# Patient Record
(demographics unavailable — no encounter records)

---

## 2017-01-04 NOTE — REP
FACET BLOCK:

 

The images were reviewed with Dr. Kruse.

 

The patient has a history of low back pain.

 

The portable C-Arm is provided in the OR for Dr. Gracia for fluoroscopic

guidance. Four intraoperative fluoro spot films were obtained for needle

placement verification for bilateral lumbar facet injection. The films are on the

PACs system and are available for review.

 

36 seconds of fluoroscopy time was utilized for this procedure.

 

 

Reviewed by

JERROD Johnson 01/05/2017 09:08 AEdited and Signed by

Shon Kruse MD 01/05/2017 03:18 P

## 2017-01-14 NOTE — ECWPNPC
PATIENT NAME: JERRY MILLER

: 1955

GENDER: FEMALE

MRN: [id number]

VISIT DATE: 2017

DISCHARGE DATE: 17

VISIT LOCKED DATE TIME: 

PHYSICIAN: PEDRO HERNÁNDEZ  

RESOURCE: PEDRO HERNÁNDEZ  

 

           

           

REASON FOR APPOINTMENT

           

          1. LFBD

           

HISTORY OF PRESENT ILLNESS

           

      HISTORY OF PRESENT ILLNESS:

      PAIN

           

           

          THE PATIENT DESCRIBES THE PAIN...

           

      FALL RISK SCREENING:

      SCREENING

           

           

          :NO FALLS IN THE PAST YEAR

           

CURRENT MEDICATIONS

           

          TAKING CYMBALTA 60 MG CAPSULE DELAYED RELEASE PARTICLES 1 CAPSULE

          ORALLY TWICE A DAY, NOTES: 17 0800

          TAKING SKELAXIN 800 MG TABLET 1 TABLET ORALLY BID PRN, NOTES:

          17 08

          TAKING GABAPENTIN 300 MG CAPSULE 1 CAPSULE ORALLY THREE TIMES A

          DAY FOR PAINMDD3, NOTES: 17

          TAKING ALLOPURINOL 300 MG TABLET 1 TABLET ORALLY ONCE A DAY,

          NOTES: 17 08

          TAKING SINGULAIR 10 MG TABLET 1 TABLET IN THE EVENING ORALLY ONCE

          A DAY, NOTES: 17 08

          TAKING BENTYL 20 MG TABLET 1 TABLET ORALLY EVERY 4 HOURS AS

          NEEDED, NOTES: 17 0800

          TAKING VENTOLIN  (90 BASE) MCG/ACT AEROSOL SOLUTION 2

          PUFFS INHALATION AS NEEDED, NOTES: NONE RECENTLY

          TAKING VITAMIN B COMPLEX CAPSULE AS DIRECTED ORALLY DAILY, NOTES:

          17 0800

          TAKING HYDROCHLOROTHIAZIDE 25 25 MG TABLET AS DIRECTED ORAL

          DAILY, NOTES: 17 0800

          TAKING LATANOPROST 0.005 % SOLUTION 1 DROP INTO BOTH EYES EVENING

          OPHTHALMIC ONCE A DAY, NOTES: 1/3/17 2100

          TAKING SYMBICORT 80-4.5 MCG/ACT AEROSOL 2 PUFFS INHALATION TWICE

          A DAY, NOTES: 17 0800

          TAKING PREMARIN 0.625 MG/GM CREAM VAGINAL THREE TIMES A WEEK,

          NOTES: 17

          TAKING TYLENOL 500 MG TABLET 1 TABLET AS NEEDED ORALLY EVERY 6

          HRS, NOTES: NONE RECENTLY

          TAKING BENTYL 10 MG CAPSULE 1 CAPSULE ORALLY TWO TIMES A DAY,

          NOTES: NONE RECENTLY

          TAKING REFRESH CELLUVISC 1 % SOLUTION 1 DROP INTO AFFECTED EYE AS

          NEEDED OPHTHALMIC 24 TIME(S) A DAY, NOTES: 17 1400

          TAKING DIFLUCAN 200 MG TABLET 1 TABLET ORALLY AS DIRECTED, NOTES:

          MONTHS

          TAKING FIBER 58.6 % POWDER ORALLY AS NEEDED, NOTES: 17 0800

          TAKING CRANBERRY 405 MG CAPSULE ORALLY DAILY, NOTES: 17 0800

          TAKING ZYRTEC 10 MG TABLET CHEWABLE 1 TABLET ORALLY ONCE A DAY,

          NOTES: 17 79938

          TAKING TRAMADOL HCL 50 MG TABLET 1 TABLET AS NEEDED ORALLY Q8H

          PRN MDD3, NOTES: 17

          TAKING FLONASE ALLERGY RELIEF 50 MCG/ACT SUSPENSION 1 SPRAY IN

          EACH NOSTRIL NASALLY ONCE A DAY, NOTES: 17 0800

          TAKING OXYBUTYNIN CHLORIDE 5 MG TABLET 2 TABLETS ORALLY 2 TIMES A

          DAY, NOTES: 17 0800

          TAKING CABERGOLINE 0.5 MG TABLET 0.5 TABLET ORALLY TWICE A WEEK

          (TAKES 0.05 MG 1/2 TABLET), NOTES: MEDICATION DISCONTINUED

          NOT-TAKING XIIDRA 5 % SOLUTION 1 DROP INTO AFFECTED EYE

          OPHTHALMIC TWICE A DAY

          NOT-TAKING MONOVISC 88 MG/4ML SOLUTION PREFILLED SYRINGE

          INTRA-ARTICULAR EVERY 6 MONTHS, NOTES: EVERY 6 MO (LAST 16)

          NOT-TAKING RHINOCORT AQUA 32 MCG/ACT SUSPENSION 1 SPRAY IN EACH

          NOSTRIL NASALLY AS NEEDED

          NOT-TAKING OXYBUTYNIN CHLORIDE ER 10 MG TABLET EXTENDED RELEASE

          24 HOUR 1 TABLET ORALLY BID

          NOT-TAKING ULTRAM 50 MG TABLET 1 TABLET AS NEEDED FOR PAIN ORALLY

          EVERY 6 HRS MDD1

          NOT-TAKING NASACORT ALLERGY 24HR 55 MCG/ACT AEROSOL 1 PUFF IN

          EACH NOSTRIL NASALLY ONCE A DAY

          NOT-TAKING CELEBREX 100 MG CAPSULE 1 CAPSULE ORALLY TWICE A DAY

          NOT-TAKING VITAMIN C 500 MG TABLET AS DIRECTED ORALLY DAILY,

          NOTES: 16 0900

          MEDICATION LIST REVIEWED AND RECONCILED WITH THE PATIENT

           

PAST MEDICAL HISTORY

           

          DIABETES

          BLOOD PRESSURE

          ASTHMA

          PITUITARY ADENOMA

          ARTHRITIS

          RUPTURED DISC/BACK PAIN

          IBS WITH DIARRHEA

          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

           

ALLERGIES

           

          PENICILLIN (FOR ALLERGIES USE ONLY): RASH: ALLERGY

          SULFA (FOR ALLERGY USE ONLY): RASH: ALLERGY

          PHENERGAN: BLOOD CLOT: SIDE EFFECTS

          LATEX CONDOMS, RUBBER BANDS, GLOVES: RASH: ALLERGY

          ERYTHROMYCIN: RASH: ALLERGY

          NICLKEL: RASH: ALLERGY

          SULFITES: DYSPNEA: ALLERGY

           

SURGICAL HISTORY

           

          BTL 

          GB 

          ACL 

          ROTATOR CUFF 2015

           

HOSPITALIZATION/MAJOR DIAGNOSTIC PROCEDURE

           

          SURGERY RELATED

          ASTHMA

           

REVIEW OF SYSTEMS

           

      CONSTITUTIONAL:

           

          ANY CHANGE IN YOUR MEDICAL CONDITION? NO . CHILLS NO . FEVER NO .

           

      INFECTION:

           

          DO YOU HAVE NEW INFECTIONS? NO . DO YOU HAVE HISTORY OF MRSA? NO

          .

           

      MUSCULOSKELETAL:

           

          ANY NEW PATTERNS OF PAIN OR NUMBNESS? NO .

           

      GASTROENTEROLOGY:

           

          ANY NEW CHANGE IN BOWEL CONTROL? NO .

           

      GENITOURINARY:

           

          ANY NEW CHANGE IN BLADDER CONTROL? NO . IS THERE A CHANCE YOU

          COULD BE PREGNANT? NO .

           

      HEMATOLOGY/LYMPH:

           

          DO YOU TAKE ANY BLOOD THINNERS? (FOR EXAMPLE- COUMADIN, PLAVIX,

          AGGRENOX, PLATEL, PRADAXA, OR XARELTO) NO . WHEN WAS YOUR LAST

          DOSE? DATE: TIME: .

           

      NEUROLOGY:

           

          HAVE YOU FALLEN IN THE PAST 6 MONTHS? NO . ANY NEW EXTREMITY

          NUMBNESS OR WEAKNESS? NO .

           

      CARDIOLOGY:

           

          DO YOU HAVE A PACEMAKER OR DEFIBRILLATOR? NO .

           

      RESPIRATORY:

           

          HAVE YOU BEEN SICK IN THE PAST WEEK? NO . FEVER NO . FLU LIKE

          SYMPTOMS? NO . COUGH NO .

           

      INTEGUMENTARY:

           

          DO YOU HAVE ANY RASHES OR OPEN SORES? NO .

           

      ALLERGIC/IMMUNO:

           

          ARE YOU ALLERGIC TO SHELLFISH OR IV DYE? NO . ANY NEW ALLERGIES?

          NO .

           

      PSYCHIATRIC:

           

          DO YOU HAVE THOUGHTS OF HURTING YOURSELF OR SOMEONE ELSE? NO .

          ARE YOU ABUSED, NEGLECTED, OR IN AN UNSAFE ENVIRONMENT? NO .

           

      ENDOCRINOLOGY:

           

          ARE YOU DIABETIC? NO .

           

      OTHER:

           

          DO YOU NEED ANY PRESCRIPTIONS? NO . IF YES, PLEASE LIST: ____ .

          ANY NEW PROBLEMS WITH YOUR MEDICATIONS? NO . WHEN DID YOU LAST

          EAT?  8AM . WHEN DID YOU LAST DRINK? 12AM . WHAT DID YOU LAST

          DRINK? COFFEE . NAME OF PERSON DRIVING YOU HOME? SENA . DO YOU

          HAVE ANY OTHER QUESTIONS OR CONCERNS PT STATES THAT SHE IS NOT

          ABLE TO FILL NEURONTIN SCRIPT, CALL [facility] .

           

      REVIEWED BY:

           

          PROVIDER: _____ .

           

VITAL SIGNS

           

           LBS, HT 65", BMI 39.10 INDEX, /68 MM HG, HR 61 /MIN,

          RR 16 /MIN, TEMP 95.6 F, OXYGEN SAT % 92%, NA INITIALS SC 14:30,

          REVIEWED BY: LS.

           

ASSESSMENTS

           

          SPONDYLOSIS WITHOUT MYELOPATHY OR RADICULOPATHY, LUMBAR REGION -

          M47.816 (PRIMARY)

           

          SPONDYLOSIS WITHOUT MYELOPATHY OR RADICULOPATHY, LUMBOSACRAL

          REGION - M47.817

           

PROCEDURES

           

      PN LUMBAR FACET BLOCK DIAGNOSTIC

          PRE PROCEDURE DIAGNOSIS LUMBAR SPONDYLOSIS, LUMBOSACRAL

          SPONDYLOSIS

          POST PROCEDURE DIAGNOSIS LUMBAR SPONDYLOSIS, LUMBOSACRAL

          SPONDYLOSIS

          PROCEDURE BILATERAL L4-L5 AND BILATERAL L5-S1 FACET BLOCK

          DIAGNOSTIC NUMBER 1

          SURGEON DR. PEDRO HERNÁNDEZ

          ASSISTANT NONE

          ANESTHESIA LOCAL

          PRE PROCEDURE NOTE THE PATIENT WITH HISTORY OF CHRONIC LOW BACK

          PAIN. I EVALUATED THE PATIENT AND REVIEWED THE CHART. I WENT OVER

          THE RISKS, ALTERNATIVES, AND BENEFITS ASSOCIATED WITH THIS

          PROCEDURE. THE PATIENT WOULD LIKE TO PROCEED AND GAVE CONSENT TO

          PERFORM THE PROCEDURE. AS AGREED WITH THE PATIENT WE ARE DOING

          THIS PROCEDURE TO DETERMINE IF THE PATIENT IS A CANDIDATE FOR A

          RADIOFREQUENCY ABLATION OF THE FACETS JOINTS. THE PATIENT DENIES

          UNEXPLAINABLE WEIGHT LOSS, FEVER, CHILLS, OR NEW CHANGES IN

          URINARY OR BOWEL CONTROL

          DESCRIPTION OF PROCEDURE THE PATIENT WAS BROUGHT TO THE PROCEDURE

          ROOM AND PLACED IN THE PRONE POSITION. THE LUMBOSACRAL AREA WAS

          CLEANED WITH CHLORAPREP SOLUTION AND DRAPED ASEPTICALLY. THE

          PROCEDURE WAS DONE UNDER STERILE CONDITIONS. I CHECKED LATERALITY

          AND THE LEVEL WHERE THE PROCEDURE WAS GOING TO BE PERFORMED WITH

          THE PATIENT AND THE SUPPORTING STAFF AT THE MOMENT OF THE TIME

          OUT IN THE PROCEDURE ROOM. UNDER FLUOROSCOPIC GUIDANCE, TARGETS

          WERE SELECTED AT THE INTERSECTION OF THE RIGHT AND LEFT

          TRANSVERSE PROCESS OF L4, L5 AND ALA OF S1 WITH ITS RESPECTIVE

          SUPERIOR ARTICULAR PROCESS. LIDOCAINE WAS USED TO NUMB THE SKIN

          AND THE SUBCUTANEOUS TISSUE BELOW IT. SPINAL NEEDLE, 22-GAUGE WAS

          ADVANCED UNDER FLUOROSCOPIC GUIDANCE AND FOLLOWING PATIENT

          FEEDBACK UNTIL THE TARGETS WERE REACHED. POSITION OF THE NEEDLES

          WAS VERIFIED WITH AP AND LATERAL VIEWS. AFTER PROPER POSITION OF

          THE NEEDLES WAS ACHIEVED, ISOVUE-M DYE 30% 0.1 ML WAS INJECTED AT

          EACH SITE SHOWING ADEQUATE SPREAD OF THE DYE. THEN A SOLUTION OF

          0.4 ML OF BUPIVACAINE 0.25% WAS INJECTED AT EACH SITE. THERE WAS

          NO EVIDENCE OF BLOOD, PARESTHESIA OR CEREBROSPINAL FLUID DURING

          THE PROCEDURE. THE PATIENT WAS SENT TO THE RECOVERY ROOM. THE

          PATIENT WAS MOVING THE EXTREMITIES AND DOING WELL. THERE WAS NO

          COMPLICATION DURING THE PROCEDURE. FLUOROSCOPY TIME WAS 36

          SECONDS

          POST PROCEDURE NOTE THE PATIENT WILL DOCUMENT HIS PAIN LEVEL AND

          RESPONSE TO THIS PROCEDURE EVERY 30 MINUTES. THE PATIENT WILL BE

          SEEN IN A FOLLOW UP IN THE NEXT FEW WEEKS. FURTHER DETERMINATION

          FOR HIS CASE WILL BE DONE AT THE NEXT VISIT. INSTRUCTIONS WERE

          GIVEN, QUESTIONS WERE ANSWERED, AND THE PATIENT EXPRESSED

          UNDERSTANDING AND AGREED WITH THE PLAN. I, ANMOL DE LOS SANTOS,

          DOCUMENTED THE ABOVE INFORMATION ACTING AS A SCRIBE FOR DR. HERNÁNDEZ. I, DR. HERNÁNDEZ, HAVE REVIEWED THE ABOVE DOCUMENT,

          SCRIBED BY ANMOL DE LOS SANTOS, AND I VERIFY THAT IT IS ACCURATE

           

DIAGNOSTIC IMAGING

           

          [facility] FACET BLOCK (PAIN)[id number]

           

PROCEDURE CODES

           

          06286 INJ PARAVERT F JNT L/S 1 LEV

           

          24275 INJ PARAVERT F JNT L/S 2 LEV

           

          6045F RADXPS IN END [id number] PXD

           

FOLLOW UP

           

          3 WEEKS

           

 

ELECTRONICALLY SIGNED BY PEDRO HERNÁNDEZ MD ON

          2017 AT 02:31 PM EST

           

           

           

 

DISCLAIMER :

THIS IS A VISIT SUMMARY [facility]ACTED FROM THE [facility].

IT IS NOT A COPY OF THE [employer] PROGRESS NOTE.

MTDD

## 2017-01-19 NOTE — ECWPNPC
PATIENT NAME: JERRY MILLER

: 1955

GENDER: FEMALE

MRN: [id number]

VISIT DATE: 2017

DISCHARGE DATE: 17 1012

VISIT LOCKED DATE TIME: 

PHYSICIAN: PEDRO HERNÁNDEZ  

RESOURCE: PEDRO HERNÁNDEZ  

 

           

           

REASON FOR APPOINTMENT

           

          1. LOW BACK PAIN W/C

           

HISTORY OF PRESENT ILLNESS

           

      HISTORY OF PRESENT ILLNESS:

      PAIN

           

           

          THE PATIENT DESCRIBES THE PAIN...

           

          61 YEAR OLD FEMALE PATIENT WITH HISTORY OF CHRONIC LOW BACK PAIN.

          PATIENT DESCRIBES THE PAIN AS ACHING, TENDER, THROBBING, SORE,

          AND SHOOTING. PATIENT WAS HURT IN WORK RELATED INJURY IN 

          WHEN SHE WAS HELPING AN OBESE WOMAN OFF OF A TOILET AND HURT HER

          BACK. PATIENT RECEIVED A DIAGNOSTIC LUMBAR FACET BLOCK ON 17

          AND REPORTS HAVING OVER 50% PAIN RELIEF FOR 24 HOURS. CURRENTLY

          THE PATIENT IS USING TRAMADOL AS NEEDED, SKELAXIN 800 MG 2 TIMES

          A DAY, AND CYMBALTA 2 TIMES A DAY. PATIENT STATES THAT THE

          MEDICATION HELPS HER TO STAY FUNCTIONAL AND MOBILE. MRS. MILLER

          IS WAITING FOR COMP TO APPROVE THE GABAPENTIN PRESCRIPTION. AT

          THIS TIME THE PATIENT STATES THAT SITTING, STANDING, OR WALKING

          FOR LONG PERIODS OF TIME THE PAIN IN HER LOWER BACK IS INCREASED.

          MEDICATIONS, INTERVENTIONS, RESTING, ICE AND HEAT HELP TO RELIEVE

          THE BACK PAIN. PATIENT DENIES UNEXPLAINABLE WEIGHT LOSS, FEVER,

          CHILLS, NEW CHANGES ON HER URINARY OR BOWEL CONTROL.

           

      FALL RISK SCREENING:

      SCREENING

           

           

          :NO FALLS IN THE PAST YEAR

           

CURRENT MEDICATIONS

           

          TAKING CYMBALTA 30 MG CAPSULE DELAYED RELEASE PARTICLES 1 CAPSULE

          ORALLY TWICE A DAY, NOTES: 17 08

          TAKING SKELAXIN 800 MG TABLET 1 TABLET ORALLY BID PRN, NOTES:

          17

          TAKING GABAPENTIN 300 MG CAPSULE 1 CAPSULE ORALLY THREE TIMES A

          DAY FOR PAINMDD3, NOTES: 17

          TAKING ALLOPURINOL 300 MG TABLET 1 TABLET ORALLY ONCE A DAY,

          NOTES: 17

          TAKING SINGULAIR 10 MG TABLET 1 TABLET IN THE EVENING ORALLY ONCE

          A DAY, NOTES: 17 08

          TAKING BENTYL 20 MG TABLET 1 TABLET ORALLY EVERY 4 HOURS AS

          NEEDED, NOTES: 17 0800

          TAKING VENTOLIN  (90 BASE) MCG/ACT AEROSOL SOLUTION 2

          PUFFS INHALATION AS NEEDED, NOTES: NONE RECENTLY

          TAKING VITAMIN B COMPLEX CAPSULE AS DIRECTED ORALLY DAILY, NOTES:

          17 08

          TAKING HYDROCHLOROTHIAZIDE 25 25 MG TABLET AS DIRECTED ORAL

          DAILY, NOTES: 17 08

          TAKING LATANOPROST 0.005 % SOLUTION 1 DROP INTO BOTH EYES EVENING

          OPHTHALMIC ONCE A DAY, NOTES: 1/3/17 2100

          TAKING SYMBICORT 80-4.5 MCG/ACT AEROSOL 2 PUFFS INHALATION TWICE

          A DAY, NOTES: 17 08

          TAKING PREMARIN 0.625 MG/GM CREAM VAGINAL THREE TIMES A WEEK,

          NOTES: 17

          TAKING TYLENOL 500 MG TABLET 1 TABLET AS NEEDED ORALLY EVERY 6

          HRS, NOTES: NONE RECENTLY

          TAKING BENTYL 10 MG CAPSULE 1 CAPSULE ORALLY TWO TIMES A DAY,

          NOTES: NONE RECENTLY

          TAKING REFRESH CELLUVISC 1 % SOLUTION 1 DROP INTO AFFECTED EYE AS

          NEEDED OPHTHALMIC 24 TIME(S) A DAY, NOTES: 17 1400

          TAKING DIFLUCAN 200 MG TABLET 1 TABLET ORALLY AS DIRECTED, NOTES:

          MONTHS

          TAKING FIBER 58.6 % POWDER ORALLY AS NEEDED, NOTES: 17 08

          TAKING CRANBERRY 405 MG CAPSULE ORALLY DAILY, NOTES: 17 08

          TAKING ZYRTEC 10 MG TABLET CHEWABLE 1 TABLET ORALLY ONCE A DAY,

          NOTES: 17 48313

          TAKING TRAMADOL HCL 50 MG TABLET 1 TABLET AS NEEDED ORALLY Q8H

          PRN MDD3, NOTES: 17

          TAKING FLONASE ALLERGY RELIEF 50 MCG/ACT SUSPENSION 1 SPRAY IN

          EACH NOSTRIL NASALLY ONCE A DAY, NOTES: 17 08

          TAKING OXYBUTYNIN CHLORIDE 5 MG TABLET 2 TABLETS ORALLY 2 TIMES A

          DAY, NOTES: 17 08

          TAKING CABERGOLINE 0.5 MG TABLET 0.5 TABLET ORALLY TWICE A WEEK

          (TAKES 0.05 MG 1/2 TABLET), NOTES: MEDICATION DISCONTINUED

          TAKING ALPHA LIPOIC ACID 100 MG CAPSULE 1 CAP ORALLY DAILY

          NOT-TAKING XIIDRA 5 % SOLUTION 1 DROP INTO AFFECTED EYE

          OPHTHALMIC TWICE A DAY

          NOT-TAKING MONOVISC 88 MG/4ML SOLUTION PREFILLED SYRINGE

          INTRA-ARTICULAR EVERY 6 MONTHS, NOTES: EVERY 6 MO (LAST 16)

          NOT-TAKING RHINOCORT AQUA 32 MCG/ACT SUSPENSION 1 SPRAY IN EACH

          NOSTRIL NASALLY AS NEEDED

          NOT-TAKING OXYBUTYNIN CHLORIDE ER 10 MG TABLET EXTENDED RELEASE

          24 HOUR 1 TABLET ORALLY BID

          NOT-TAKING ULTRAM 50 MG TABLET 1 TABLET AS NEEDED FOR PAIN ORALLY

          EVERY 6 HRS MDD1

          NOT-TAKING NASACORT ALLERGY 24HR 55 MCG/ACT AEROSOL 1 PUFF IN

          EACH NOSTRIL NASALLY ONCE A DAY

          NOT-TAKING CELEBREX 100 MG CAPSULE 1 CAPSULE ORALLY TWICE A DAY

          NOT-TAKING VITAMIN C 500 MG TABLET AS DIRECTED ORALLY DAILY,

          NOTES: 16 0900

          MEDICATION LIST REVIEWED AND RECONCILED WITH THE PATIENT

           

PAST MEDICAL HISTORY

           

          DIABETES

          BLOOD PRESSURE

          ASTHMA

          PITUITARY ADENOMA

          ARTHRITIS

          RUPTURED DISC/BACK PAIN

          IBS WITH DIARRHEA

          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

           

ALLERGIES

           

          PENICILLIN (FOR ALLERGIES USE ONLY): RASH: ALLERGY

          SULFA (FOR ALLERGY USE ONLY): RASH: ALLERGY

          PHENERGAN: BLOOD CLOT: SIDE EFFECTS

          LATEX CONDOMS, RUBBER BANDS, GLOVES: RASH: ALLERGY

          ERYTHROMYCIN: RASH: ALLERGY

          NICLKEL: RASH: ALLERGY

          SULFITES: DYSPNEA: ALLERGY

          WHEAT: GI UPSET, WATERY EYES AND SINUS CONGERSTION

           

SURGICAL HISTORY

           

          BTL 

          GB 

          ACL 

          ROTATOR CUFF 2015

           

FAMILY HISTORY

           

          NO FAMILY HISTORY DOCUMENTED.

           

SOCIAL HISTORY

           

          GENERAL:

           

          TOBACCO USE

          ARE YOU A:NONSMOKER

           

           

          LEARNING BARRIERS / SPECIAL NEEDS ORIENTED TO PLAN OF CARE:

          PATIENT, PAIN MANAGEMENT PATIENT, ORIENTED TO PLAN OF CARE:

          PATIENT, PAIN MANAGEMENT PATIENT.

           

           

          NEW PATIENT PAIN DIARY

          TODAY'S VISITNOTES

          FROM 0-10, WHAT LEVEL IS YOUR PAIN TODAY?0

           

           

          PAIN CLINIC PFS, CLERGY, PUBLIC HEALTH REFERRALS

          PFS REFERRAL NEEDED?NO

          CLERGY REFERRAL NEEDED?NO

          PUBLIC HEALTH REFERRAL NEEDED?NO

          WAS THE PROVIDER NOTIFIED OF ANY PERTINENT INFO?NO

          PFS REFERRAL NEEDED?NO

          CLERGY REFERRAL NEEDED?NO

          PUBLIC HEALTH REFERRAL NEEDED?NO

          WAS THE PROVIDER NOTIFIED OF ANY PERTINENT INFO?NO

           

HOSPITALIZATION/MAJOR DIAGNOSTIC PROCEDURE

           

          SURGERY RELATED

          ASTHMA

           

REVIEW OF SYSTEMS

           

      CONSTITUTIONAL:

           

          ANY CHANGE IN YOUR MEDICAL CONDITION? NO . CHILLS NO . FEVER NO .

           

      INFECTION:

           

          DO YOU HAVE NEW INFECTIONS? NO . DO YOU HAVE HISTORY OF MRSA? NO

          .

           

      MUSCULOSKELETAL:

           

          ANY NEW PATTERNS OF PAIN OR NUMBNESS? NO .

           

      GASTROENTEROLOGY:

           

          ANY NEW CHANGE IN BOWEL CONTROL? NO .

           

      GENITOURINARY:

           

          ANY NEW CHANGE IN BLADDER CONTROL? NO . IS THERE A CHANCE YOU

          COULD BE PREGNANT? NO .

           

      HEMATOLOGY/LYMPH:

           

          DO YOU TAKE ANY BLOOD THINNERS? (FOR EXAMPLE- COUMADIN, PLAVIX,

          AGGRENOX, PLATEL, PRADAXA, OR XARELTO) NO . WHEN WAS YOUR LAST

          DOSE? DATE: TIME: .

           

      NEUROLOGY:

           

          HAVE YOU FALLEN IN THE PAST 6 MONTHS? NO . ANY NEW EXTREMITY

          NUMBNESS OR WEAKNESS? NO .

           

      CARDIOLOGY:

           

          DO YOU HAVE A PACEMAKER OR DEFIBRILLATOR? NO .

           

      RESPIRATORY:

           

          HAVE YOU BEEN SICK IN THE PAST WEEK? NO . FEVER NO . FLU LIKE

          SYMPTOMS? NO . COUGH NO .

           

      INTEGUMENTARY:

           

          DO YOU HAVE ANY RASHES OR OPEN SORES? NO .

           

      ALLERGIC/IMMUNO:

           

          ARE YOU ALLERGIC TO SHELLFISH OR IV DYE? NO . ANY NEW ALLERGIES?

          NO .

           

      PSYCHIATRIC:

           

          DO YOU HAVE THOUGHTS OF HURTING YOURSELF OR SOMEONE ELSE? NO .

          ARE YOU ABUSED, NEGLECTED, OR IN AN UNSAFE ENVIRONMENT? NO .

           

      ENDOCRINOLOGY:

           

          ARE YOU DIABETIC? NO .

           

      OTHER:

           

          DO YOU NEED ANY PRESCRIPTIONS? NO . IF YES, PLEASE LIST: ____ .

          ANY NEW PROBLEMS WITH YOUR MEDICATIONS? NO . WHEN DID YOU LAST

          EAT? ____ . WHEN DID YOU LAST DRINK? ____ . WHAT DID YOU LAST

          DRINK? ____ . NAME OF PERSON DRIVING YOU HOME? ____ . DO YOU HAVE

          ANY OTHER QUESTIONS OR CONCERNS YES, GABAPENTIN STILL HAS NOT

          BEEN FILLED, INSURANCE HASN'T APPROVED IT YET. .

           

      REVIEWED BY:

           

          PROVIDER: PEDRO HERNÁNDEZ MD .

           

VITAL SIGNS

           

           LBS, HT 65", BMI 38.60 INDEX, /74 MM HG, HR 84 /MIN,

          RR 16 /MIN, TEMP 97.4 F, OXYGEN SAT % 97, REVIEWED BY: AD.

           

EXAMINATION

           

       : PATIENT IS ALERT O X 3 AND COOPERATIVE. ANTALGIC

          GAIT, LIMPING FROM THE LEFT SIDE. TENDERNESS IN THE LOWER BACK

          AND PARASPINAL MUSCLE GROUP MOSTLY THE LEFT SIDE. PATIENT ABLE TO

          FLEX BACK 90 DEGREES AND EXTEND THE BACK 35 WITH DISCOMFORT. MRI

          DONE ON 16 OF THE LUMBAR SPINE SHOWS DISC BULGES AT L2-L3

          AND L3-L4 ALONG WITH STENOSIS AT L3-L4.

           

ASSESSMENTS

           

          SPONDYLOSIS WITHOUT MYELOPATHY OR RADICULOPATHY, LUMBAR REGION -

          M47.816 (PRIMARY)

           

          SPONDYLOSIS WITHOUT MYELOPATHY OR RADICULOPATHY, LUMBOSACRAL

          REGION - M47.817

           

          INTERVERTEBRAL DISC DISORDERS WITH RADICULOPATHY, LUMBAR REGION -

          M51.16

           

          INTERVERTEBRAL DISC DISORDERS WITH RADICULOPATHY, LUMBOSACRAL

          REGION - M51.17

           

TREATMENT

           

      SPONDYLOSIS WITHOUT MYELOPATHY OR RADICULOPATHY,

          LUMBAR REGION

          NOTES: WE DISCUSSED SEVERAL ISSUES WITH MRS. MILLER'S PAIN

          MANAGEMENT CASE. PATIENT WILL CONTINUE WITH THE SAME MEDICATION

          MANAGEMENT AS BEFORE. PATIENT DENIES ABUSE OF ANY MEDICATION,

          DENIES USE OF ILLEGAL SUBSTANCES, AND STATES THAT SHE IS ONLY

          USING THE MEDICATION FOR PAIN MANAGEMENT. DUE TO THE FIRST

          DIAGNOSTIC TEST HAVING ADEQUATE RESULTS I WOULD LIKE TO MOVE

          FORWARD WITH A LEFT DIAGNOSTIC LUMBAR FACET TO CONSIDER

          RADIOFREQUENCY. WE DISCUSSED THE RISKS, BENEFITS, AND

          ALTERNATIVES OF THE INJECTION AND THE PATIENT WOULD LIKE TO

          PROCEED. INSTRUCTIONS WERE GIVEN, QUESTIONS WERE ANSWERED,

          PATIENT REPORTS UNDERSTANDING AND AGREES WITH THE PLAN. I, ANMOL DE LOS SANTOS, DOCUMENTED THE ABOVE INFORMATION ACTING AS A SCRIBE FOR

          DR. HERNÁNDEZ. I HAVE REVIEWED THE ABOVE DOCUMENT, WRITTEN BY

          ANMOL DE LOS SANTOS SCRIBCARMEN AND I VERIFY THAT IT IS ACCURATE.

           

           

      OTHERS

          REFILL GABAPENTIN CAPSULE, 300 MG, 1 CAPSULE, ORALLY SPECIAL

          FUNDS, TWO TIMES A DAY FOR PAINMDD2, 30 DAY(S), 60, REFILLS 2,

          NOTES: 17

           

PROCEDURES

           

      PN WORKMANS' COMP OPINION

          IN YOUR OPINION, WAS THE INCIDENT THAT THE PATIENT DESCRIBED THE

          COMPETENT MEDICAL CAUSE OF THIS INJURY/ILLNESS? YES

          ARE THE PATIENT'S COMPLAINTS CONSISTENT WITH HIS/HER HISTORY OF

          THE INJURY/ILLNESS? YES

          IS THE PATIENT'S HISTORY OF THE INJURY/ILLNESS CONSISTENT WITH

          YOUR OBJECTIVE FINDING? YES

          WHAT IS THE PERCENTAGE OF TEMPORARY IMPAIRMENT? MODERATE TO

          MARKED = 66.7%

          IS THE PATIENT WORKING? NO

          DOCTOR ON SITE: PEDRO MILLER MD

           

PREVENTIVE MEDICINE

           

      PAIN CLINIC TEACHING:

           

          PROCEDURE TEACHING PT. DECLINED PRINTED INFORMATION ON DIAGNOSTIC

          FACET BLOCK. AD.

           

PROCEDURE CODES

           

           ESTABILISHED PATIENT [facility] FACILITY CHARGE

           

           DOC MEDS VERIFIED W/PT OR RE

           

           PAIN ASSESS POS TOOL F/U PLAN DOC

           

FOLLOW UP

           

          LFBD AFTER APPROVAL

           

 

ELECTRONICALLY SIGNED BY PEDRO HERNÁNDEZ MD ON

          2017 AT 04:21 PM EST

           

           

           

 

DISCLAIMER :

THIS IS A VISIT SUMMARY EXTRACTED FROM THE [employer] CHART.

IT IS NOT A COPY OF THE [employer] PROGRESS NOTE.

AGUSTINA

## 2017-01-27 NOTE — REP
Partial lumbar spine series:  Single view.

 

History:  Facet block for pain.

 

27 seconds of fluoroscopy time is reported.

 

Findings:  A single fluoroscopically obtained intraprocedural spot radiograph of

the lumbar spine documents various needle positions and contrast injections

associated with lumbar facet injection procedure.

 

 

 

 

Signed by

James Kumar MD 01/27/2017 04:58 P

## 2017-02-18 NOTE — ECWPNPC
PATIENT NAME: JERRY MILLER

: 1955

GENDER: FEMALE

MRN: [id number]

VISIT DATE: 2017

DISCHARGE DATE: 17 1001

VISIT LOCKED DATE TIME: 

PHYSICIAN: PEDRO HERNÁNDEZ  

RESOURCE: PEDRO HERNÁNDEZ  

 

           

           

REASON FOR APPOINTMENT

           

          1. W/C POST PROCEDURE

           

HISTORY OF PRESENT ILLNESS

           

      HISTORY OF PRESENT ILLNESS:

      PAIN

           

           

          THE PATIENT DESCRIBES THE PAIN...

           

          61 YEAR OLD FEMALE PATIENT WITH HISTORY OF CHRONIC BACK PAIN.

          PATIENT DESCRIBES THE PAIN AS ACHING, SHARP, TENDER, THROBBING,

          SORE, AND HAVING IT ALL THE TIME WITH A PAIN SCORE OF 8/10 ON

          TODAY'S VISIT. PATIENT WAS INJURED IN A WORK RELATED INJURY ON

          2005 WORKING FOR [employer], PATIENT

          WAS HELPING A CLIENT ONTO THE TOILET WHEN SHE INJURED HER BACK.

          PATIENT RECEIVE A LUMBAR FACET BLOCK DIAGNOSTIC ON 2017 AND

          HER PAIN WENT FROM A 8/10 TO 0/10 FOR 5 HOURS. PATIENT REPORTS

          THAT MEDICATIONS SHE IS TAKING HELPS WITH KEEPING HER PAIN

          MANAGEABLE. PATIENT STATES THAT SHE DOES GENTLE PT EXERCISES AT

          HOME. PATIENT DENIES UNEXPLAINABLE WEIGHT LOSS, FEVER, CHILLS,

          NEW CHANGES ON HIS/HER URINARY OR BOWEL CONTROL.

           

      FALL RISK SCREENING:

      SCREENING

           

           

          :NO FALLS IN THE PAST YEAR

           

CURRENT MEDICATIONS

           

          TAKING GABAPENTIN 300 MG CAPSULE 1 CAPSULE ORALLY [facility]

          TWO TIMES A DAY FOR PAINMDD2

          TAKING CYMBALTA 30 MG CAPSULE DELAYED RELEASE PARTICLES 1 CAPSULE

          ORALLY TWICE A DAY

          TAKING SKELAXIN 800 MG TABLET 1 TABLET ORALLY BID PRN

          TAKING ALLOPURINOL 300 MG TABLET 1 TABLET ORALLY ONCE A DAY

          TAKING SINGULAIR 10 MG TABLET 1 TABLET IN THE EVENING ORALLY ONCE

          A DAY

          TAKING BENTYL 20 MG TABLET 1 TABLET ORALLY EVERY 4 HOURS AS

          NEEDED

          TAKING VENTOLIN  (90 BASE) MCG/ACT AEROSOL SOLUTION 2

          PUFFS INHALATION AS NEEDED

          TAKING VITAMIN B COMPLEX CAPSULE AS DIRECTED ORALLY DAILY

          TAKING HYDROCHLOROTHIAZIDE 25 25 MG TABLET AS DIRECTED ORAL DAILY

          TAKING LATANOPROST 0.005 % SOLUTION 1 DROP INTO BOTH EYES EVENING

          OPHTHALMIC ONCE A DAY

          TAKING SYMBICORT 80-4.5 MCG/ACT AEROSOL 2 PUFFS INHALATION TWICE

          A DAY

          TAKING PREMARIN 0.625 MG/GM CREAM VAGINAL THREE TIMES A WEEK

          TAKING TYLENOL 500 MG TABLET 1 TABLET AS NEEDED ORALLY EVERY 6

          HRS

          TAKING BENTYL 10 MG CAPSULE 1 CAPSULE ORALLY TWO TIMES A DAY

          TAKING REFRESH CELLUVISC 1 % SOLUTION 1 DROP INTO AFFECTED EYE AS

          NEEDED OPHTHALMIC 24 TIME(S) A DAY

          TAKING DIFLUCAN 200 MG TABLET 1 TABLET ORALLY AS DIRECTED

          TAKING FIBER 58.6 % POWDER 2 CAPSULES WITH 8 OUNCES OF LIQUID

          ORALLY DAILY

          TAKING CRANBERRY 405 MG CAPSULE ORALLY DAILY

          TAKING ZYRTEC 10 MG TABLET CHEWABLE 1 TABLET ORALLY ONCE A DAY

          TAKING TRAMADOL HCL 50 MG TABLET 1 TABLET AS NEEDED ORALLY Q8H

          PRN MDD3

          TAKING FLONASE ALLERGY RELIEF 50 MCG/ACT SUSPENSION 1 SPRAY IN

          EACH NOSTRIL NASALLY ONCE A DAY

          TAKING OXYBUTYNIN CHLORIDE 5 MG TABLET 2 TABLETS ORALLY 2 TIMES A

          DAY

          TAKING ALPHA LIPOIC ACID 100 MG CAPSULE 1 CAP ORALLY DAILY

          TAKING XIIDRA 5 % SOLUTION 1 DROP INTO AFFECTED EYE OPHTHALMIC

          TWICE A DAY

          NOT-TAKING CABERGOLINE 0.5 MG TABLET 0.5 TABLET ORALLY TWICE A

          WEEK (TAKES 0.05 MG 1/2 TABLET), NOTES: MEDICATION DISCONTINUED

          NOT-TAKING MONOVISC 88 MG/4ML SOLUTION PREFILLED SYRINGE

          INTRA-ARTICULAR EVERY 6 MONTHS, NOTES: EVERY 6 MO (LAST 16)

          NOT-TAKING RHINOCORT AQUA 32 MCG/ACT SUSPENSION 1 SPRAY IN EACH

          NOSTRIL NASALLY AS NEEDED

          NOT-TAKING OXYBUTYNIN CHLORIDE ER 10 MG TABLET EXTENDED RELEASE

          24 HOUR 1 TABLET ORALLY BID

          NOT-TAKING ULTRAM 50 MG TABLET 1 TABLET AS NEEDED FOR PAIN ORALLY

          EVERY 6 HRS MDD1

          NOT-TAKING NASACORT ALLERGY 24HR 55 MCG/ACT AEROSOL 1 PUFF IN

          EACH NOSTRIL NASALLY ONCE A DAY

          NOT-TAKING CELEBREX 100 MG CAPSULE 1 CAPSULE ORALLY TWICE A DAY

          NOT-TAKING VITAMIN C 500 MG TABLET AS DIRECTED ORALLY DAILY,

          NOTES: 16 0900

          MEDICATION LIST REVIEWED AND RECONCILED WITH THE PATIENT

           

PAST MEDICAL HISTORY

           

          DIABETES

          BLOOD PRESSURE

          ASTHMA

          PITUITARY ADENOMA

          ARTHRITIS

          RUPTURED DISC/BACK PAIN

          IBS WITH DIARRHEA

          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

          CHRONIC SINUSITIS

           

ALLERGIES

           

          PENICILLIN (FOR ALLERGIES USE ONLY): RASH: ALLERGY

          SULFA (FOR ALLERGY USE ONLY): RASH: ALLERGY

          PHENERGAN: BLOOD CLOT: SIDE EFFECTS

          LATEX CONDOMS, RUBBER BANDS, GLOVES: RASH: ALLERGY

          ERYTHROMYCIN: RASH: ALLERGY

          NICLKEL: RASH: ALLERGY

          SULFITES: DYSPNEA: ALLERGY

          WHEAT: GI UPSET, WATERY EYES AND SINUS CONGERSTION: SIDE EFFECTS

           

SURGICAL HISTORY

           

          BTL 

          GB 

          ACL 

          RIGHT ROTATOR CUFF 2015

           

FAMILY HISTORY

           

          NO FAMILY HISTORY DOCUMENTED.

           

SOCIAL HISTORY

           

          GENERAL:

           

          TOBACCO USE

          ARE YOU A:NONSMOKER

           

           

          LEARNING BARRIERS / SPECIAL NEEDS ORIENTED TO PLAN OF CARE:

          PATIENT, PAIN MANAGEMENT PATIENT, ORIENTED TO PLAN OF CARE:

          PATIENT, PAIN MANAGEMENT PATIENT.

           

           

          NEW PATIENT PAIN DIARY

          TODAY'S VISITNOTES

          FROM 0-10, WHAT LEVEL IS YOUR PAIN TODAY?0

           

           

          PAIN CLINIC PFS, CLERGY, PUBLIC HEALTH REFERRALS

          PFS REFERRAL NEEDED?NO

          CLERGY REFERRAL NEEDED?NO

          PUBLIC HEALTH REFERRAL NEEDED?NO

          WAS THE PROVIDER NOTIFIED OF ANY PERTINENT INFO?NO

          PFS REFERRAL NEEDED?NO

          CLERGY REFERRAL NEEDED?NO

          PUBLIC HEALTH REFERRAL NEEDED?NO

          WAS THE PROVIDER NOTIFIED OF ANY PERTINENT INFO?NO

           

HOSPITALIZATION/MAJOR DIAGNOSTIC PROCEDURE

           

          SURGERY RELATED

          ASTHMA

           

REVIEW OF SYSTEMS

           

      CONSTITUTIONAL:

           

          ANY CHANGE IN YOUR MEDICAL CONDITION? NO . CHILLS NO . FEVER NO .

           

      INFECTION:

           

          DO YOU HAVE NEW INFECTIONS? NO . DO YOU HAVE HISTORY OF MRSA? NO

          .

           

      MUSCULOSKELETAL:

           

          ANY NEW PATTERNS OF PAIN OR NUMBNESS? NO .

           

      GASTROENTEROLOGY:

           

          ANY NEW CHANGE IN BOWEL CONTROL? NO .

           

      GENITOURINARY:

           

          ANY NEW CHANGE IN BLADDER CONTROL? NO . IS THERE A CHANCE YOU

          COULD BE PREGNANT? NO .

           

      HEMATOLOGY/LYMPH:

           

          DO YOU TAKE ANY BLOOD THINNERS? (FOR EXAMPLE- COUMADIN, PLAVIX,

          AGGRENOX, PLATEL, PRADAXA, OR XARELTO) NO . WHEN WAS YOUR LAST

          DOSE? DATE: TIME: .

           

      NEUROLOGY:

           

          HAVE YOU FALLEN IN THE PAST 6 MONTHS? NO . ANY NEW EXTREMITY

          NUMBNESS OR WEAKNESS? NO .

           

      CARDIOLOGY:

           

          DO YOU HAVE A PACEMAKER OR DEFIBRILLATOR? NO .

           

      RESPIRATORY:

           

          HAVE YOU BEEN SICK IN THE PAST WEEK? NO . FEVER NO . FLU LIKE

          SYMPTOMS? NO . COUGH NO .

           

      INTEGUMENTARY:

           

          DO YOU HAVE ANY RASHES OR OPEN SORES? NO .

           

      ALLERGIC/IMMUNO:

           

          ARE YOU ALLERGIC TO SHELLFISH OR IV DYE? NO . ANY NEW ALLERGIES?

          NO .

           

      PSYCHIATRIC:

           

          DO YOU HAVE THOUGHTS OF HURTING YOURSELF OR SOMEONE ELSE? NO .

          ARE YOU ABUSED, NEGLECTED, OR IN AN UNSAFE ENVIRONMENT? NO .

           

      ENDOCRINOLOGY:

           

          ARE YOU DIABETIC? NO .

           

      OTHER:

           

          DO YOU NEED ANY PRESCRIPTIONS? NO . IF YES, PLEASE LIST: ____ .

          ANY NEW PROBLEMS WITH YOUR MEDICATIONS? NO . WHEN DID YOU LAST

          EAT? ____ . WHEN DID YOU LAST DRINK? ____ . WHAT DID YOU LAST

          DRINK? ____ . NAME OF PERSON DRIVING YOU HOME? ____ . DO YOU HAVE

          ANY OTHER QUESTIONS OR CONCERNS NO .

           

      REVIEWED BY:

           

          PROVIDER: PEDRO HERNÁNDEZ MD .

           

VITAL SIGNS

           

          .2 LBS, HT 65", BMI 38.64 INDEX, /76 MM HG, HR 69

          /MIN, RR 16 /MIN, TEMP 96.7 F, OXYGEN SAT % 98%, NA INITIALS SC

          09:15, REVIEWED BY: LS.

           

EXAMINATION

           

       : PATIENT IS ALERT O X 3 AND COOPERATIVE. ANTALGIC

          GAIT, LIMPING FROM THE LEFT SIDE. TENDERNESS IN THE LOWER BACK

          AND PARASPINAL MUSCLE GROUP MOSTLY THE LEFT SIDE. PATIENT IS ABLE

          TO EXTEND HER BACK AT 15 DEGREES WITH PAIN AND DISCOMFORT,

          PATIENT IS FLEXIBLE IS FLEXING HER BACK. MRI DONE ON 16 OF

          THE LUMBAR SPINE SHOWS DISC BULGES AT L2-L3 AND L3-L4 ALONG WITH

          STENOSIS AT L3-L4.

           

ASSESSMENTS

           

          SPONDYLOSIS WITHOUT MYELOPATHY OR RADICULOPATHY, LUMBAR REGION -

          M47.816 (PRIMARY)

           

          SPONDYLOSIS WITHOUT MYELOPATHY OR RADICULOPATHY, LUMBOSACRAL

          REGION - M47.817

           

          INTERVERTEBRAL DISC DISORDERS WITH RADICULOPATHY, LUMBAR REGION -

          M51.16

           

          INTERVERTEBRAL DISC DISORDERS WITH RADICULOPATHY, LUMBOSACRAL

          REGION - M51.17

           

TREATMENT

           

      SPONDYLOSIS WITHOUT MYELOPATHY OR RADICULOPATHY,

          LUMBAR REGION

          NOTES: WE DISCUSSED SEVERAL ISSUES WITH MS. MILLER'S PAIN

          MANAGEMENT CASE. AT THIS TIME THE PATIENT WILL CONTINUE ON THE

          SAME MEDICATION REGIMEN AS BEFORE, AND WILL RECEIVE A REFILL OF

          THE NEEDED MEDICATIONS. DUE TO THE SUCCESS OF THE DIAGNOSTIC

          BLOCK PATIENT IS A GOOD CANDIDATE FOR A LEFT L4-L5 AND LEFT L5-S1

          RADIOFREQUENCY, WE DISCUSSED THE RISK, BENEFITS, AND ALTERNATIVES

          AND THE PATIENT WOULD LIKE TO PROCEED. PATIENT WILL BE BOOKED

          PENDING APPROVAL. INSTRUCTIONS WERE GIVEN, QUESTIONS WERE

          ANSWERED, PATIENT REPORTS UNDERSTANDING AND AGREES WITH THE PLAN.

          I, SANDY UREÑA, DOCUMENTED THE ABOVE INFORMATION ACTING AS A

          SCRIBE FOR DR. HERNÁNDEZ. I HAVE REVIEWED THE ABOVE DOCUMENT,

          WRITTEN BY SANDY CORBETTIBCARMEN AND I VERIFY THAT IT IS ACCURATE.

           

           

      OTHERS

          CONTINUE CYMBALTA CAPSULE DELAYED RELEASE PARTICLES, 30 MG, 1

          CAPSULE, ORALLY FOR PAIN, TWICE A DAY MDD2, 30 DAY(S), 60,

          REFILLS 2

           

PROCEDURES

           

      PN WORKMANS' COMP OPINION

          IN YOUR OPINION, WAS THE INCIDENT THAT THE PATIENT DESCRIBED THE

          COMPETENT MEDICAL CAUSE OF THIS INJURY/ILLNESS? YES

          ARE THE PATIENT'S COMPLAINTS CONSISTENT WITH HIS/HER HISTORY OF

          THE INJURY/ILLNESS? YES

          IS THE PATIENT'S HISTORY OF THE INJURY/ILLNESS CONSISTENT WITH

          YOUR OBJECTIVE FINDING? YES

          WHAT IS THE PERCENTAGE OF TEMPORARY IMPAIRMENT? MODERATE TO

          MARKED = 66.7%

          IS THE PATIENT WORKING? YES

          DOCTOR ON SITE: PEDRO MILLER MD

           

PREVENTIVE MEDICINE

           

      PAIN CLINIC TEACHING:

           

          PROCEDURE TEACHING PRE RADIOFREQUENCY FACET BLOCK INSTRUCTIONS

          REVIEWED WITH PT. VERBALZIED UNDERSTANDING..

           

PROCEDURE CODES

           

           ESTABILISHED PATIENT [facility] CHARGE

           

           PAIN ASSESS POS TOOL F/U PLAN DOC

           

           DOC MEDS VERIFIED W/PT OR RE

           

DISPOSITION & COMMUNICATION

           

FOLLOW UP

           

          LT L4-L5 N LT L5-S1 RF PENDING APPROVAL

           

 

ELECTRONICALLY SIGNED BY PEDRO HERNÁNDEZ MD ON

          2017 AT 09:15 PM EST

           

           

           

 

DISCLAIMER :

THIS IS A VISIT SUMMARY EXTRACTED FROM THE [employer] CHART.

IT IS NOT A COPY OF THE [employer] PROGRESS NOTE.

AGUSTINA

## 2017-03-28 NOTE — REP
FLUOROSCOPIC GUIDANCE FOR LUMBAR FACET BLOCK:  03/28/2017

 

CLINICAL HISTORY: back pain.

 

Three images from fluoroscopic guidance for lumbar radiofrequency ablation left

facets from L3-4 through L5, S1.  Three needles were seen on the AP oblique and

lateral views.

 

Fluoroscopy time: 1 minute 16 seconds.

 

 

Signed by

Spencer Nance MD 03/28/2017 05:08 P

## 2017-03-31 NOTE — ECWPNPC
PATIENT NAME: JERRY MILLER

: 1955

GENDER: FEMALE

MRN: [id number]

VISIT DATE: 2017

DISCHARGE DATE: 17 1315

VISIT LOCKED DATE TIME: 

PHYSICIAN: PEDRO HERNÁNDEZ  

RESOURCE: PEDRO HERNÁNDEZ  

 

           

           

REASON FOR APPOINTMENT

           

          1. LEFT L4-L5 AND LEFT L5-S1 RADIOFREQUENCY

           

HISTORY OF PRESENT ILLNESS

           

      HISTORY OF PRESENT ILLNESS:

      PAIN

           

           

          THE PATIENT DESCRIBES THE PAIN...

           

      FALL RISK SCREENING:

      SCREENING

           

           

          :NO FALLS IN THE PAST YEAR

           

CURRENT MEDICATIONS

           

          TAKING CYMBALTA 30 MG CAPSULE DELAYED RELEASE PARTICLES 1 CAPSULE

          ORALLY FOR PAIN TWICE A DAY MDD2, NOTES: 3-26-17

          TAKING GABAPENTIN 300 MG CAPSULE 1 CAPSULE ORALLY SPECIAL FUNDS

          TWO TIMES A DAY FOR PAINMDD2, NOTES: 3-26-17

          TAKING SKELAXIN 800 MG TABLET 1 TABLET ORALLY BID PRN, NOTES:

          NONE

          TAKING ALLOPURINOL 300 MG TABLET 1 TABLET ORALLY ONCE A DAY,

          NOTES: 3-26-17

          TAKING SINGULAIR 10 MG TABLET 1 TABLET IN THE EVENING ORALLY ONCE

          A DAY, NOTES: 3-26-17

          TAKING BENTYL 20 MG TABLET 1 TABLET ORALLY EVERY 4 HOURS AS

          NEEDED, NOTES: 3-26-17

          TAKING VENTOLIN  (90 BASE) MCG/ACT AEROSOL SOLUTION 2

          PUFFS INHALATION AS NEEDED, NOTES: NONE

          TAKING VITAMIN B COMPLEX CAPSULE AS DIRECTED ORALLY DAILY, NOTES:

          3-26-17

          TAKING HYDROCHLOROTHIAZIDE 25 25 MG TABLET AS DIRECTED ORAL

          DAILY, NOTES: 3-26-17

          TAKING LATANOPROST 0.005 % SOLUTION 1 DROP INTO BOTH EYES EVENING

          OPHTHALMIC ONCE A DAY, NOTES: 3-27-17 PM

          TAKING SYMBICORT 80-4.5 MCG/ACT AEROSOL 2 PUFFS INHALATION TWICE

          A DAY, NOTES: NONE

          TAKING PREMARIN 0.625 MG/GM CREAM VAGINAL THREE TIMES A WEEK,

          NOTES: WEEKS AGO

          TAKING TYLENOL 500 MG TABLET 1 TABLET AS NEEDED ORALLY EVERY 6

          HRS, NOTES: NONE

          TAKING BENTYL 10 MG CAPSULE 1 CAPSULE ORALLY TWO TIMES A DAY,

          NOTES: NONE

          TAKING REFRESH CELLUVISC 1 % SOLUTION 1 DROP INTO AFFECTED EYE AS

          NEEDED OPHTHALMIC 24 TIME(S) A DAY, NOTES: 3-28-17

          TAKING DIFLUCAN 200 MG TABLET 1 TABLET ORALLY AS DIRECTED

          TAKING FIBER 58.6 % POWDER 2 CAPSULES WITH 8 OUNCES OF LIQUID

          ORALLY DAILY, NOTES: 3-26-17

          TAKING CRANBERRY 405 MG CAPSULE ORALLY DAILY, NOTES: 3-26-17

          TAKING TRAMADOL HCL 50 MG TABLET 1 TABLET AS NEEDED ORALLY Q8H

          PRN MDD3, NOTES: 3-25-17

          TAKING FLONASE ALLERGY RELIEF 50 MCG/ACT SUSPENSION 1 SPRAY IN

          EACH NOSTRIL NASALLY ONCE A DAY, NOTES: 3-28-17

          TAKING OXYBUTYNIN CHLORIDE 5 MG TABLET 2 TABLETS ORALLY 2 TIMES A

          DAY, NOTES: 3-26-17

          TAKING ALPHA LIPOIC ACID 100 MG CAPSULE 1 CAP ORALLY DAILY,

          NOTES: 3-26-17

          TAKING POTASSIUM CHLORIDE 10 MEQ CAPSULE EXTENDED RELEASE 1

          CAPSULE WITH FOOD ORALLY ONCE A DAY, NOTES: 3-26-17

          TAKING OMEPRAZOLE 20 MG CAPSULE DELAYED RELEASE 2 CAPSULES ORALLY

          ONCE A DAY, NOTES: 3-26-17

          TAKING PROBIOTIC - CAPSULE ORALLY DAILY, NOTES: 3-26-17

          NOT-TAKING XIIDRA 5 % SOLUTION 1 DROP INTO AFFECTED EYE

          OPHTHALMIC TWICE A DAY

          NOT-TAKING CABERGOLINE 0.5 MG TABLET 0.5 TABLET ORALLY TWICE A

          WEEK (TAKES 0.05 MG 1/2 TABLET), NOTES: MEDICATION DISCONTINUED

          NOT-TAKING MONOVISC 88 MG/4ML SOLUTION PREFILLED SYRINGE

          INTRA-ARTICULAR EVERY 6 MONTHS, NOTES: EVERY 6 MO (LAST 16)

          NOT-TAKING RHINOCORT AQUA 32 MCG/ACT SUSPENSION 1 SPRAY IN EACH

          NOSTRIL NASALLY AS NEEDED

          NOT-TAKING OXYBUTYNIN CHLORIDE ER 10 MG TABLET EXTENDED RELEASE

          24 HOUR 1 TABLET ORALLY BID

          NOT-TAKING ULTRAM 50 MG TABLET 1 TABLET AS NEEDED FOR PAIN ORALLY

          EVERY 6 HRS MDD1

          NOT-TAKING NASACORT ALLERGY 24HR 55 MCG/ACT AEROSOL 1 PUFF IN

          EACH NOSTRIL NASALLY ONCE A DAY

          NOT-TAKING CELEBREX 100 MG CAPSULE 1 CAPSULE ORALLY TWICE A DAY

          NOT-TAKING VITAMIN C 500 MG TABLET AS DIRECTED ORALLY DAILY,

          NOTES: 16 0900

          DISCONTINUED ZYRTEC 10 MG TABLET CHEWABLE 1 TABLET ORALLY ONCE A

          DAY

          MEDICATION LIST REVIEWED AND RECONCILED WITH THE PATIENT

           

PAST MEDICAL HISTORY

           

          DIABETES

          BLOOD PRESSURE

          ASTHMA

          PITUITARY ADENOMA

          ARTHRITIS

          RUPTURED DISC/BACK PAIN

          IBS WITH DIARRHEA

          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

          CHRONIC SINUSITIS

           

ALLERGIES

           

          PENICILLIN (FOR ALLERGIES USE ONLY): RASH: ALLERGY

          SULFA (FOR ALLERGY USE ONLY): RASH: ALLERGY

          PHENERGAN: BLOOD CLOT: SIDE EFFECTS

          LATEX CONDOMS, RUBBER BANDS, GLOVES: RASH: ALLERGY

          ERYTHROMYCIN: RASH: ALLERGY

          NICLKEL: RASH: ALLERGY

          SULFITES: DYSPNEA: ALLERGY

          WHEAT: GI UPSET, WATERY EYES AND SINUS CONGERSTION: SIDE EFFECTS

           

REVIEW OF SYSTEMS

           

      CONSTITUTIONAL:

           

          ANY CHANGE IN YOUR MEDICAL CONDITION? NO . CHILLS NO . FEVER NO .

           

      INFECTION:

           

          DO YOU HAVE NEW INFECTIONS? NO . DO YOU HAVE HISTORY OF MRSA? NO

          .

           

      MUSCULOSKELETAL:

           

          ANY NEW PATTERNS OF PAIN OR NUMBNESS? NO .

           

      GASTROENTEROLOGY:

           

          ANY NEW CHANGE IN BOWEL CONTROL? NO .

           

      GENITOURINARY:

           

          ANY NEW CHANGE IN BLADDER CONTROL? NO . IS THERE A CHANCE YOU

          COULD BE PREGNANT? NO .

           

      HEMATOLOGY/LYMPH:

           

          DO YOU TAKE ANY BLOOD THINNERS? (FOR EXAMPLE- COUMADIN, PLAVIX,

          AGGRENOX, PLATEL, PRADAXA, OR XARELTO) NO . WHEN WAS YOUR LAST

          DOSE? DATE: TIME: .

           

      NEUROLOGY:

           

          HAVE YOU FALLEN IN THE PAST 6 MONTHS? NO . ANY NEW EXTREMITY

          NUMBNESS OR WEAKNESS? NO .

           

      CARDIOLOGY:

           

          DO YOU HAVE A PACEMAKER OR DEFIBRILLATOR? NO .

           

      RESPIRATORY:

           

          HAVE YOU BEEN SICK IN THE PAST WEEK? NO . FEVER NO . FLU LIKE

          SYMPTOMS? NO . COUGH NO .

           

      INTEGUMENTARY:

           

          DO YOU HAVE ANY RASHES OR OPEN SORES? NO .

           

      ALLERGIC/IMMUNO:

           

          ARE YOU ALLERGIC TO SHELLFISH OR IV DYE? NO . ANY NEW ALLERGIES?

          NO .

           

      PSYCHIATRIC:

           

          DO YOU HAVE THOUGHTS OF HURTING YOURSELF OR SOMEONE ELSE? NO .

          ARE YOU ABUSED, NEGLECTED, OR IN AN UNSAFE ENVIRONMENT? NO .

           

      ENDOCRINOLOGY:

           

          ARE YOU DIABETIC? NO .

           

      OTHER:

           

          DO YOU NEED ANY PRESCRIPTIONS? NO . IF YES, PLEASE LIST: ____ .

          ANY NEW PROBLEMS WITH YOUR MEDICATIONS? NO . WHEN DID YOU LAST

          EAT? 3-27-17 2100 . WHEN DID YOU LAST DRINK? 3-28-17 0800 . WHAT

          DID YOU LAST DRINK? WATER . NAME OF PERSON DRIVING YOU HOME? SENA

          . DO YOU HAVE ANY OTHER QUESTIONS OR CONCERNS NO .

           

      REVIEWED BY:

           

          PROVIDER: _____ .

           

VITAL SIGNS

           

          .0 LBS, HT 65", BMI 38.60 INDEX, /84 MM HG, HR 69

          /MIN, RR 18 /MIN, TEMP 98.0 F, OXYGEN SAT % 98, NA INITIALS

          [id number], REVIEWED BY: CM.

           

ASSESSMENTS

           

          SPONDYLOSIS WITHOUT MYELOPATHY OR RADICULOPATHY, LUMBAR REGION -

          M47.816 (PRIMARY)

           

          SPONDYLOSIS WITHOUT MYELOPATHY OR RADICULOPATHY, LUMBOSACRAL

          REGION - M47.817

           

TREATMENT

           

      OTHERS

          CONTINUE OMEPRAZOLE CAPSULE DELAYED RELEASE, 20 MG, 1 CAP,

          ORALLY, ONCE A DAY, 30 DAY(S), 30 CAPSULE, REFILLS 2, NOTES:

          3--17

           

PROCEDURES

           

      PN RADIOFREQUENCY

          PRE PROCEDURE DIAGNOSES 1. LUMBAR SPONDYLOSIS 2. LUMBOSACRAL

          SPONDYLOSIS

          POST PROCEDURE DIAGNOSES 1. LUMBAR SPONDYLOSIS 2. LUMBOSACRAL

          SPONDYLOSIS

          PROCEDURE LEFT L4-L5 AND LEFT L5-S1 LUMBAR FACET RADIOFREQUENCY

          SURGEON DR. PEDRO HERNÁNDEZ

          ASSISTANT NONE

          ANESTHESIA LOCAL

          PRE PROCEDURE REPORT THE PATIENT HAS HISTORY OF CHRONIC LOW BACK

          PAIN. I EVALUATE THE PATIENT AND REVIEWED THE CHART. I WENT OVER

          THE RISKS, ALTERNATIVES, AND BENEFITS ASSOCIATED WITH THIS

          PROCEDURE. THE PATIENT WOULD LIKE TO PROCEED AND GIVE CONSENT TO

          PERFORMED THE PROCEDURE. THE PATIENT DENIES UNEXPLAINABLE WEIGHT

          LOSS, FEVER, CHILLS, OR NEW CHANGES IN URINARY OR BOWEL CONTROL

          DESCRIPTION OF PROCEDURE THE PATIENT WAS BROUGHT TO THE PROCEDURE

          ROOM AND PLACED IN THE PRONE POSITION. THE LUMBOSACRAL AREA WAS

          CLEANED WITH CHLORAPREP SOLUTION AND DRAPED ASEPTICALLY. THE

          PROCEDURE WAS DONE UNDER STERILE CONDITIONS. I CHECKED LATERALITY

          AND THE LEVEL WHERE THE PROCEDURE WAS GOING TO BE PERFORMED WITH

          THE PATIENT AND THE SUPPORTING STAFF AT THE MOMENT OF THE TIME

          OUT IN THE PROCEDURE ROOM. UNDER FLUOROSCOPIC GUIDANCE, TARGETS

          WERE SELECTED AT THE INTERSECTION OF THE LEFT TRANSVERSE PROCESS

          OF L4, L5 AND ALA OF S1 WITH ITS RESPECTIVE SUPERIOR ARTICULAR

          PROCESS. LIDOCAINE WAS USED TO NUMB THE SKIN AND THE SUBCUTANEOUS

          TISSUE BELOW IT. RADIOFREQUENCY NEEDLES 22-GAUGE 15 CM LONG WITH

          10 MM ACTIVE CURVE TIP WERE ADVANCED UNDER FLUOROSCOPIC GUIDANCE

          AND FOLLOWING PATIENT FEEDBACK UNTIL THE TARGET AREA WAS REACHED.

          POSITION OF THE NEEDLES WAS VERIFIED WITH AP AND LATERAL VIEWS.

          AFTER PROPER POSITION OF THE NEEDLE WAS ACHIEVED, WE WORKED WITH

          THE LEFT SELECTED MEDIAN BRANCHES OF L3, L4 AND THE DORSAL RAMI

          OF L5. WE MEASURED THE CORRESPONDING IMPEDANCES, SENSORY

          STIMULATION AND MOTOR RESPONSES AS INDICATED IN THE

          RADIOFREQUENCY WORK SHEET. POSITION OF THE NEEDLES WAS VERIFIED

          AGAIN WITH AP AND LATERAL VIEWS. LIDOCAINE 1%, 2 ML, WAS INJECTED

          AT EACH LEVEL. RADIOFREQUENCY WAS DONE AT EACH LEVEL AT 80

          DEGREES FOR 90 SECONDS. AFTER RADIOFREQUENCY WAS DONE, THE

          PATIENT RECEIVED BUPIVACAINE 0.125% 1 CC WITH KENALOG 5 MG AT

          EACH SITE. THERE WAS NO EVIDENCE OF BLOOD, PARESTHESIA OR

          CEREBROSPINAL FLUID DURING THE PROCEDURE. THE PATIENT WAS SENT TO

          THE RECOVERY ROOM. THE PATIENT WAS MOVING THE EXTREMITIES AND

          DOING WELL. THERE WAS NO COMPLICATION DURING THE PROCEDURE.

          FLUOROSCOPY TIME WAS 1 MIN 16 SECONDS

          POST PROCEDURE NOTE THE PATIENT WILL BE SEEN IN A FOLLOW UP IN

          THE NEXT FEW WEEKS. INSTRUCTIONS WERE GIVEN, QUESTIONS WERE

          ANSWERED, AND THE PATIENT EXPRESSED UNDERSTANDING AND AGREES WITH

          THE PLAN. I, ANMOL DE LOS SANTOS, DOCUMENTED THE ABOVE INFORMATION

          ACTING AS A SCRIBE FOR DR. HERNÁNDEZ. I, DR. HERNÁNDEZ, HAVE

          REVIEWED THE ABOVE DOCUMENT, SCRIBED BY ANMOL DE LOS SANTOS, AND I

          VERIFY THAT IT IS ACCURATE

           

DIAGNOSTIC IMAGING

           

          [facility] FACET BLOCK (PAIN)[phone]

           

PROCEDURE CODES

           

           ESTABILISHED PATIENT [facility] FACILITY CHARGE

           

          21679 DESTROY LUMB/SAC FACET JNT

           

          64099 DESTROY L/S FACET JNT ADDL

           

          [id number] RADXPS IN END [id number]

           

DISPOSITION & COMMUNICATION

           

FOLLOW UP

           

          3 WEEKS

           

 

ELECTRONICALLY SIGNED BY PEDRO HERNÁNDEZ MD ON

          2017 AT 01:46 PM EDT

           

           

           

 

DISCLAIMER :

THIS IS A VISIT SUMMARY EXTRACTED FROM THE [employer] CHART.

IT IS NOT A COPY OF THE [employer] PROGRESS NOTE.

MTDMICHELLE

## 2017-05-15 NOTE — ECWPNPC
PATIENT NAME: JERRY MILLER

: 1955

GENDER: FEMALE

MRN: [id number]

VISIT DATE: 2017

DISCHARGE DATE: 17 1607

VISIT LOCKED DATE TIME: 

PHYSICIAN: PEDRO HERNÁNDEZ  

RESOURCE: PEDRO HERNÁNDEZ  

 

           

           

REASON FOR APPOINTMENT

           

          1. LOW BACK PAIN W/C

           

HISTORY OF PRESENT ILLNESS

           

      HISTORY OF PRESENT ILLNESS:

      PAIN

           

           

          THE PATIENT DESCRIBES THE PAIN...

           

          61 YEAR OLD FEMALE PATIENT WITH HISTORY OF CHRONIC LOW BACK PAIN.

          PATIENT DESCRIBES THE PAIN AS ACHING, STABBING, TENDER,

          THROBBING, SORE, SHOOTING, AND HAVING IT ALL THE TIME ON THE LEFT

          LOWER BACK WITH A PAIN SCORE OF 7/10. PATIENT WAS INJURED IN A

          WORK RELATED INJURY ON 2005 WORKING FOR [employer]

          [employer] AND RE[facility], PATIENT WAS HELPING A CLIENT ONTO THE TOILET

          WHEN SHE INJURED HER BACK. MRS. MILLER RECEIVED A RIGHT

          RADIOFREQUENCY ON 3/28/17 AND REPORTS HAVING OVER 50% RELIEF IN

          PAIN AND INCREASED MOBILITY AND FUNCTIONALITY HOWEVER SHE FEELS A

          LOT OF PAIN OVER THE LEFT SIDE AT THIS TIME. CURRENTLY THE

          PATIENT IS USING GABAPENTIN, SKELAXIN, CYMBALTA, AND TRAMADOL AND

          STATES THAT THE MEDICATION KEEPS HER MOBILE AND FUNCTIONAL.

          PATIENT DENIES UNEXPLAINABLE WEIGHT LOSS, FEVER, CHILLS, NEW

          CHANGES ON HER URINARY OR BOWEL CONTROL.

           

      FALL RISK SCREENING:

      SCREENING

           

           

          :NO FALLS IN THE PAST YEAR

           

CURRENT MEDICATIONS

           

          TAKING CYMBALTA 30 MG CAPSULE DELAYED RELEASE PARTICLES 1 CAPSULE

          ORALLY FOR PAIN TWICE A DAY MDD2

          TAKING GABAPENTIN 300 MG CAPSULE 1 CAPSULE ORALLY [facility]

          TWO TIMES A DAY FOR PAINMDD2

          TAKING SKELAXIN 800 MG TABLET 1 TABLET ORALLY BID PRN

          TAKING ALLOPURINOL 300 MG TABLET 1 TABLET ORALLY ONCE A DAY

          TAKING SINGULAIR 10 MG TABLET 1 TABLET IN THE EVENING ORALLY ONCE

          A DAY

          TAKING BENTYL 20 MG TABLET 1 TABLET ORALLY EVERY 4 HOURS AS

          NEEDED

          TAKING VENTOLIN  (90 BASE) MCG/ACT AEROSOL SOLUTION 2

          PUFFS INHALATION AS NEEDED

          TAKING VITAMIN B COMPLEX CAPSULE AS DIRECTED ORALLY DAILY

          TAKING HYDROCHLOROTHIAZIDE 25 25 MG TABLET AS DIRECTED ORAL DAILY

          TAKING LATANOPROST 0.005 % SOLUTION 1 DROP INTO BOTH EYES EVENING

          OPHTHALMIC ONCE A DAY

          TAKING SYMBICORT 80-4.5 MCG/ACT AEROSOL 2 PUFFS INHALATION TWICE

          A DAY

          TAKING PREMARIN 0.625 MG/GM CREAM VAGINAL THREE TIMES A WEEK

          TAKING TYLENOL 500 MG TABLET 1 TABLET AS NEEDED ORALLY EVERY 6

          HRS

          TAKING BENTYL 10 MG CAPSULE 1 CAPSULE ORALLY TWO TIMES A DAY

          TAKING REFRESH CELLUVISC 1 % SOLUTION 1 DROP OU OPHTHALMIC 24

          TIME(S) A DAY

          TAKING DIFLUCAN 200 MG TABLET 1 TABLET ORALLY AS DIRECTED

          TAKING FIBER 58.6 % POWDER 2 CAPSULES WITH 8 OUNCES OF LIQUID

          ORALLY DAILY

          TAKING CRANBERRY 405 MG CAPSULE ORALLY DAILY

          TAKING TRAMADOL HCL 50 MG TABLET 1 TABLET AS NEEDED ORALLY Q8H

          PRN MDD3

          TAKING FLONASE ALLERGY RELIEF 50 MCG/ACT SUSPENSION 1 SPRAY IN

          EACH NOSTRIL NASALLY ONCE A DAY

          TAKING OXYBUTYNIN CHLORIDE 5 MG TABLET 2 TABLETS ORALLY 2 TIMES A

          DAY

          TAKING ALPHA LIPOIC ACID 100 MG CAPSULE 1 CAP ORALLY DAILY

          TAKING POTASSIUM CHLORIDE 10 MEQ CAPSULE EXTENDED RELEASE 1

          CAPSULE WITH FOOD ORALLY ONCE A DAY

          TAKING PROBIOTIC - CAPSULE ORALLY DAILY

          TAKING OMEPRAZOLE 20 MG CAPSULE DELAYED RELEASE 1 CAP ORALLY ONCE

          A DAY

          TAKING ALLERX 1 DROP OU BID

          NOT-TAKING XIIDRA 5 % SOLUTION 1 DROP INTO AFFECTED EYE

          OPHTHALMIC TWICE A DAY

          NOT-TAKING CABERGOLINE 0.5 MG TABLET 0.5 TABLET ORALLY TWICE A

          WEEK (TAKES 0.05 MG 1/2 TABLET), NOTES: MEDICATION DISCONTINUED

          NOT-TAKING MONOVISC 88 MG/4ML SOLUTION PREFILLED SYRINGE

          INTRA-ARTICULAR EVERY 6 MONTHS, NOTES: EVERY 6 MO (LAST 16)

          NOT-TAKING RHINOCORT AQUA 32 MCG/ACT SUSPENSION 1 SPRAY IN EACH

          NOSTRIL NASALLY AS NEEDED

          NOT-TAKING OXYBUTYNIN CHLORIDE ER 10 MG TABLET EXTENDED RELEASE

          24 HOUR 1 TABLET ORALLY BID

          NOT-TAKING ULTRAM 50 MG TABLET 1 TABLET AS NEEDED FOR PAIN ORALLY

          EVERY 6 HRS MDD1

          NOT-TAKING NASACORT ALLERGY 24HR 55 MCG/ACT AEROSOL 1 PUFF IN

          EACH NOSTRIL NASALLY ONCE A DAY

          NOT-TAKING CELEBREX 100 MG CAPSULE 1 CAPSULE ORALLY TWICE A DAY

          NOT-TAKING VITAMIN C 500 MG TABLET AS DIRECTED ORALLY DAILY,

          NOTES: 16 0900

          MEDICATION LIST REVIEWED AND RECONCILED WITH THE PATIENT

           

PAST MEDICAL HISTORY

           

          DIABETES

          BLOOD PRESSURE

          ASTHMA

          PITUITARY ADENOMA

          ARTHRITIS

          RUPTURED DISC/BACK PAIN

          IBS WITH DIARRHEA

          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

          CHRONIC SINUSITIS

           

ALLERGIES

           

          PENICILLIN (FOR ALLERGIES USE ONLY): RASH: ALLERGY

          SULFA (FOR ALLERGY USE ONLY): RASH: ALLERGY

          PHENERGAN: BLOOD CLOT: SIDE EFFECTS

          LATEX CONDOMS, RUBBER BANDS, GLOVES: RASH: ALLERGY

          ERYTHROMYCIN: RASH: ALLERGY

          NICLKEL: RASH: ALLERGY

          SULFITES: DYSPNEA: ALLERGY

          WHEAT: GI UPSET, WATERY EYES AND SINUS CONGERSTION: SIDE EFFECTS

           

SURGICAL HISTORY

           

          BTL 1987

          GB 1991

          ACL 2001

          RIGHT ROTATOR CUFF 2015

           

FAMILY HISTORY

           

          NO FAMILY HISTORY DOCUMENTED.

           

SOCIAL HISTORY

           

          GENERAL:

           

          TOBACCO USE

          ARE YOU A:NONSMOKER

           

           

          LEARNING BARRIERS / SPECIAL NEEDS ORIENTED TO PLAN OF CARE:

          PATIENT, PAIN MANAGEMENT PATIENT, ORIENTED TO PLAN OF CARE:

          PATIENT, PAIN MANAGEMENT PATIENT.

           

           

          NEW PATIENT PAIN DIARY

          TODAY'S VISITNOTES

          FROM 0-10, WHAT LEVEL IS YOUR PAIN TODAY?0

           

           

          PAIN CLINIC PFS, CLERGY, PUBLIC HEALTH REFERRALS

          PFS REFERRAL NEEDED?NO

          CLERGY REFERRAL NEEDED?NO

          PUBLIC HEALTH REFERRAL NEEDED?NO

          WAS THE PROVIDER NOTIFIED OF ANY PERTINENT INFO?NO

          PFS REFERRAL NEEDED?NO

          CLERGY REFERRAL NEEDED?NO

          PUBLIC HEALTH REFERRAL NEEDED?NO

          WAS THE PROVIDER NOTIFIED OF ANY PERTINENT INFO?NO

           

HOSPITALIZATION/MAJOR DIAGNOSTIC PROCEDURE

           

          SURGERY RELATED

          ASTHMA

           

REVIEW OF SYSTEMS

           

      CONSTITUTIONAL:

           

          ANY CHANGE IN YOUR MEDICAL CONDITION? NO . CHILLS NO . FEVER NO .

           

      INFECTION:

           

          DO YOU HAVE NEW INFECTIONS? NO . DO YOU HAVE HISTORY OF MRSA? NO

          .

           

      MUSCULOSKELETAL:

           

          ANY NEW PATTERNS OF PAIN OR NUMBNESS? NO .

           

      GASTROENTEROLOGY:

           

          ANY NEW CHANGE IN BOWEL CONTROL? NO .

           

      GENITOURINARY:

           

          ANY NEW CHANGE IN BLADDER CONTROL? NO . IS THERE A CHANCE YOU

          COULD BE PREGNANT? NO .

           

      HEMATOLOGY/LYMPH:

           

          DO YOU TAKE ANY BLOOD THINNERS? (FOR EXAMPLE- COUMADIN, PLAVIX,

          AGGRENOX, PLATEL, PRADAXA, OR XARELTO) NO . WHEN WAS YOUR LAST

          DOSE? DATE: TIME: .

           

      NEUROLOGY:

           

          HAVE YOU FALLEN IN THE PAST 6 MONTHS? NO . ANY NEW EXTREMITY

          NUMBNESS OR WEAKNESS? NO .

           

      CARDIOLOGY:

           

          DO YOU HAVE A PACEMAKER OR DEFIBRILLATOR? NO .

           

      RESPIRATORY:

           

          HAVE YOU BEEN SICK IN THE PAST WEEK? NO . FEVER NO . FLU LIKE

          SYMPTOMS? NO . COUGH NO .

           

      INTEGUMENTARY:

           

          DO YOU HAVE ANY RASHES OR OPEN SORES? NO .

           

      ALLERGIC/IMMUNO:

           

          ARE YOU ALLERGIC TO SHELLFISH OR IV DYE? NO . ANY NEW ALLERGIES?

          NO .

           

      PSYCHIATRIC:

           

          DO YOU HAVE THOUGHTS OF HURTING YOURSELF OR SOMEONE ELSE? NO .

          ARE YOU ABUSED, NEGLECTED, OR IN AN UNSAFE ENVIRONMENT? NO .

           

      ENDOCRINOLOGY:

           

          ARE YOU DIABETIC? NO .

           

      OTHER:

           

          DO YOU NEED ANY PRESCRIPTIONS? NO . IF YES, PLEASE LIST: ____ .

          ANY NEW PROBLEMS WITH YOUR MEDICATIONS? NO . WHEN DID YOU LAST

          EAT? ____ . WHEN DID YOU LAST DRINK? ____ . WHAT DID YOU LAST

          DRINK? ____ . NAME OF PERSON DRIVING YOU HOME? ____ . DO YOU HAVE

          ANY OTHER QUESTIONS OR CONCERNS WOULD LIKE RF ON RIGHT .

           

      REVIEWED BY:

           

          PROVIDER: PEDRO HERNÁNDEZ MD .

           

VITAL SIGNS

           

           LBS, HT 65", BMI 39.60 INDEX, /69 MM HG, HR 73 /MIN,

          RR 16 /MIN, TEMP 98.2 F, OXYGEN SAT % 99, REVIEWED BY: AD.

           

EXAMINATION

           

       : PATIENT IS ALERT O X 3 AND COOPERATIVE. ANTALGIC

          GAIT. TENDERNESS IN THE LOWER BACK AND PARASPINAL MUSCLE GROUP

          MOSTLY THE RIGHT SIDE. PATIENT IS ABLE TO EXTEND HER BACK AT 15

          DEGREES WITH PAIN AND DISCOMFORT, PATIENT IS FLEXIBLE IS FLEXING

          HER BACK. MRI DONE ON 16 OF THE LUMBAR SPINE SHOWS DISC

          BULGES AT L2-L3 AND L3-L4 ALONG WITH STENOSIS AT L3-L4.

           

ASSESSMENTS

           

          SPONDYLOSIS WITHOUT MYELOPATHY OR RADICULOPATHY, LUMBAR REGION -

          M47.816 (PRIMARY)

           

          SPONDYLOSIS WITHOUT MYELOPATHY OR RADICULOPATHY, LUMBOSACRAL

          REGION - M47.817

           

          INTERVERTEBRAL DISC DISORDERS WITH RADICULOPATHY, LUMBAR REGION -

          M51.16

           

TREATMENT

           

      SPONDYLOSIS WITHOUT MYELOPATHY OR RADICULOPATHY,

          LUMBAR REGION

          NOTES: WE DISCUSSED SEVERAL ISSUES WITH MRS. MILLER'S PAIN

          MANAGEMENT CASE. AT THIS TIME THE PATIENT WILL CONTINUE WITH THE

          SAME MEDICATION REGIME AS BEFORE. PATIENT WILL USE THE CYMBALTA

          FOR THE NEUROPATHIC PAIN, GABAPENTIN FOR THE NEUROPATHIC PAIN,

          TRAMADOL FOR THE SOMATIC PAIN, AND SKELAXIN FOR THE MUSCLE

          SPASMS. PATIENT RECEIVED A LEFT RADIOFREQUENCY ON 3/28/17 AND

          STATES THAT SHE HAS HAD OVER 50% PAIN RELIEF AND INCREASED

          MOBILITY AND FUNCTIONALITY. PATIENT REPORTS HAVING SEVERE PAIN ON

          THE RIGHT SIDE NOW. WE DISCUSSED MOVING FORWARD WITH A DIAGNOSTIC

          TEST ON THE RIGHT SIDE TO CONSIDER RADIOFREQUENCY. WE DISCUSSED

          THE RISKS, BENENFITS, AND ALTNERATIVES OF THE INJECTION AND THE

          PATIENT WOULD LIKE TO PROCEED AT THIS TIME. INSTRUCTIONS WERE

          GIVEN, QUESTIONS WERE ANSWERED, PATIENT REPORTS UNDERSTANDING AND

          AGREES WITH THE PLAN. I, ANMOL DE LOS SANTOS, DOCUMENTED THE ABOVE

          INFORMATION ACTING AS A SCRIBE FOR DR. HERNÁNDEZ. I HAVE REVIEWED

          THE ABOVE DOCUMENT, WRITTEN BY ANMOL VILLEGAS AND I VERIFY

          THAT IT IS ACCURATE.

           

           

      OTHERS

          REFILL CYMBALTA CAPSULE DELAYED RELEASE PARTICLES, 30 MG, 1

          CAPSULE, ORALLY FOR PAIN, TWICE A DAY MDD2, 30 DAY(S), 60,

          REFILLS 2

          REFILL GABAPENTIN CAPSULE, 300 MG, 1 CAPSULE, ORALLY SPECIAL

          FUNDS, TWO TIMES A DAY FOR PAINMDD2, 30 DAY(S), 60, REFILLS 2

          NOTES: FACET JOINT INJECTION MATERIAL WAS PRINTED.

           

PROCEDURES

           

      PN WORKMANS' COMP OPINION

          IN YOUR OPINION, WAS THE INCIDENT THAT THE PATIENT DESCRIBED THE

          COMPETENT MEDICAL CAUSE OF THIS INJURY/ILLNESS? YES

          ARE THE PATIENT'S COMPLAINTS CONSISTENT WITH HIS/HER HISTORY OF

          THE INJURY/ILLNESS? YES

          IS THE PATIENT'S HISTORY OF THE INJURY/ILLNESS CONSISTENT WITH

          YOUR OBJECTIVE FINDING? YES

          WHAT IS THE PERCENTAGE OF TEMPORARY IMPAIRMENT? MODERATE TO

          MARKED = 66.7%

          IS THE PATIENT WORKING? YES

          DOCTOR ON SITE: PEDRO MILLER MD

           

PROCEDURE CODES

           

           ESTABILISHED PATIENT [facility] FACILITY CHARGE

           

           DOC MEDS VERIFIED W/PT OR RE

           

           PAIN ASSESS POS TOOL F/U PLAN DOC

           

DISPOSITION & COMMUNICATION

           

FOLLOW UP

           

          RIGHT LFBD AFTER APPROVAL

           

 

ELECTRONICALLY SIGNED BY PEDRO HERNÁNDEZ MD ON

          2017 AT 12:14 PM EDT

           

           

           

 

DISCLAIMER :

THIS IS A VISIT SUMMARY EXTRACTED FROM THE [employer] CHART.

IT IS NOT A COPY OF THE [employer] PROGRESS NOTE.

AGUSTINA